# Patient Record
Sex: MALE | Race: WHITE | Employment: OTHER | URBAN - METROPOLITAN AREA
[De-identification: names, ages, dates, MRNs, and addresses within clinical notes are randomized per-mention and may not be internally consistent; named-entity substitution may affect disease eponyms.]

---

## 2022-01-13 ENCOUNTER — APPOINTMENT (OUTPATIENT)
Dept: CT IMAGING | Age: 82
End: 2022-01-13
Payer: MEDICARE

## 2022-01-13 ENCOUNTER — HOSPITAL ENCOUNTER (EMERGENCY)
Age: 82
Discharge: HOME | End: 2022-01-13
Payer: MEDICARE

## 2022-01-13 VITALS
HEART RATE: 73 BPM | TEMPERATURE: 97.9 F | DIASTOLIC BLOOD PRESSURE: 60 MMHG | WEIGHT: 209.5 LBS | OXYGEN SATURATION: 97 % | BODY MASS INDEX: 29.99 KG/M2 | RESPIRATION RATE: 16 BRPM | SYSTOLIC BLOOD PRESSURE: 172 MMHG | HEIGHT: 70 IN
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
## 2022-01-13 DIAGNOSIS — S40.022A CONTUSION OF LEFT UPPER ARM, INITIAL ENCOUNTER: ICD-10-CM

## 2022-01-13 DIAGNOSIS — S01.81XA FACIAL LACERATION, INITIAL ENCOUNTER: ICD-10-CM

## 2022-01-13 DIAGNOSIS — S00.03XA CONTUSION OF SCALP, INITIAL ENCOUNTER: Primary | ICD-10-CM

## 2022-01-13 PROCEDURE — 90715 TDAP VACCINE 7 YRS/> IM: CPT | Performed by: PHYSICIAN ASSISTANT

## 2022-01-13 PROCEDURE — 70450 CT HEAD/BRAIN W/O DYE: CPT

## 2022-01-13 PROCEDURE — 99284 EMERGENCY DEPT VISIT MOD MDM: CPT

## 2022-01-13 PROCEDURE — 6370000000 HC RX 637 (ALT 250 FOR IP): Performed by: PHYSICIAN ASSISTANT

## 2022-01-13 PROCEDURE — 12011 RPR F/E/E/N/L/M 2.5 CM/<: CPT

## 2022-01-13 PROCEDURE — 6360000002 HC RX W HCPCS: Performed by: PHYSICIAN ASSISTANT

## 2022-01-13 PROCEDURE — 90471 IMMUNIZATION ADMIN: CPT | Performed by: PHYSICIAN ASSISTANT

## 2022-01-13 RX ORDER — LIDOCAINE HYDROCHLORIDE AND EPINEPHRINE 10; 10 MG/ML; UG/ML
20 INJECTION, SOLUTION INFILTRATION; PERINEURAL ONCE
Status: DISCONTINUED | OUTPATIENT
Start: 2022-01-13 | End: 2022-01-13 | Stop reason: HOSPADM

## 2022-01-13 RX ORDER — ACETAMINOPHEN 500 MG
1000 TABLET ORAL ONCE
Status: COMPLETED | OUTPATIENT
Start: 2022-01-13 | End: 2022-01-13

## 2022-01-13 RX ADMIN — TETANUS TOXOID, REDUCED DIPHTHERIA TOXOID AND ACELLULAR PERTUSSIS VACCINE, ADSORBED 0.5 ML: 5; 2.5; 8; 8; 2.5 SUSPENSION INTRAMUSCULAR at 18:06

## 2022-01-13 RX ADMIN — ACETAMINOPHEN 1000 MG: 500 TABLET ORAL at 18:04

## 2022-01-13 ASSESSMENT — PAIN SCALES - GENERAL: PAINLEVEL_OUTOF10: 7

## 2022-01-13 ASSESSMENT — PAIN DESCRIPTION - LOCATION: LOCATION: HEAD

## 2022-01-13 ASSESSMENT — PAIN DESCRIPTION - PAIN TYPE: TYPE: ACUTE PAIN

## 2022-01-13 ASSESSMENT — PAIN DESCRIPTION - DESCRIPTORS: DESCRIPTORS: ACHING

## 2022-01-13 NOTE — ED PROVIDER NOTES
[id number] [address] ENCOUNTER        Pt Name: Bonnie Daniels  MRN: [id number]  [place] 1940  Date of evaluation: 1/13/2022  Provider: Nohemi Becerril PA-C  PCP: Nathan Escobar  Note Started: 4:56 PM EST      LAVELL. I have evaluated this patient. My supervising physician was available for consultation. Triage CHIEF COMPLAINT       Chief Complaint   Patient presents with    Laceration     head         HISTORY OF PRESENT ILLNESS   (Location/Symptom, Timing/Onset, Context/Setting, Quality, Duration, Modifying Factors, Severity)  Note limiting factors. Chief Complaint: Scalp injury after accidental fall    Bonnie Daniels is a 79 y.o. male who presents indicates that he had a outpatient test done at another hospital earlier this afternoon. He states on the way out of that area he missed a step and struck his forehead between the eyebrows on a pole. He has bleeding and seeping that has been going since then. That was about 2:45 PM.  He states that he went to that emergency department but they could not tell him how long his wait would be and he decided to come on over here to be evaluated at this emergency department. Patient states that he did not have any loss of consciousness. No dizziness. It was not a near syncopal episode that caused him to fall but rather a misstep. He states that he also struck the back of his left upper arm on the same pole. He states that it feels a little bruised and swollen back there at this time. It is moderate local tenderness worse if you push on it and better at rest.  He speculates that likely blood on the inside. No radiating pain. He did not take any medication for comfort so far but states that he would like some Tylenol. Patient denies any headache at all at this time or any vision change or neck pain or stiffness. No shortness of breath or chest pain or heart palpitations.   He denies belly pain or nausea or vomiting or extremity acute loss range of motion or strength or sensation. Nursing Notes were all reviewed and agreed with or any disagreements were addressed in the HPI. REVIEW OF SYSTEMS    (2-9 systems for level 4, 10 or more for level 5)     Review of Systems  Positive history as above with accidental fall and some swelling and tenderness moderate local to the posterior left upper arm as well as skin wound like a laceration between the eyebrows that continues to seep and bleed. Patient denying generalized headache, no vision change neck pain or stiffness dizziness confusion syncope or near syncope nausea or vomiting or abdominal pain. No neck pain or tenderness. No difficulty breathing or swallowing. No extremity acute loss of range of motion or strength or loss of sensation. PAST MEDICAL HISTORY     Past Medical History:   Diagnosis Date    Chronic kidney disease     Hypertension        SURGICAL HISTORY     Past Surgical History:   Procedure Laterality Date    SKIN BIOPSY      x 2       CURRENTMEDICATIONS       Previous Medications    ASCORBIC ACID (VITAMIN C) 500 MG TABLET    Take 500 mg by mouth 2 times daily. ASPIRIN 81 MG TABLET    Take 81 mg by mouth daily. CHOLECALCIFEROL (VITAMIN D) 2000 UNITS CAPS CAPSULE    Take  by mouth daily. HYDROCHLOROTHIAZIDE (HYDRODIURIL) 25 MG TABLET    Take 25 mg by mouth    LOSARTAN (COZAAR) 100 MG TABLET    Take 100 mg by mouth daily. METOPROLOL (TOPROL-XL) 100 MG XL TABLET    Take 100 mg by mouth daily. MYCOPHENOLATE (CELLCEPT) 500 MG TABLET    Take 1,000 mg by mouth 2 times daily. NIFEDIPINE    1 TABLET DAILY    OMEPRAZOLE (PRILOSEC) 40 MG CAPSULE    Take 40 mg by mouth daily. TACROLIMUS (PROGRAF) 1 MG CAPSULE    Take 1 mg by mouth 2 times daily. ALLERGIES     No known allergies    FAMILYHISTORY     History reviewed. No pertinent family history.      SOCIAL HISTORY       Social History     Socioeconomic History    Marital status:      Spouse name: None    Number of children: None    Years of education: None    Highest education level: None   Occupational History    None   Tobacco Use    Smoking status: Former Smoker    Smokeless tobacco: Never Used   Substance and Sexual Activity    Alcohol use: Not Currently    Drug use: Never    Sexual activity: None   Other Topics Concern    None   Social History Narrative    None     Social Determinants of Health     Financial Resource Strain:     Difficulty of Paying Living Expenses: Not on file   Food Insecurity:     Worried About Running Out of Food in the Last Year: Not on file    Luiza of Food in the Last Year: Not on file   Transportation Needs:     Lack of Transportation (Medical): Not on file    Lack of Transportation (Non-Medical):  Not on file   Physical Activity:     Days of Exercise per Week: Not on file    Minutes of Exercise per Session: Not on file   Stress:     Feeling of Stress : Not on file   Social Connections:     Frequency of Communication with Friends and Family: Not on file    Frequency of Social Gatherings with Friends and Family: Not on file    Attends Latter day Services: Not on file    Active Member of [address] [place] [address] or Organizations: Not on file    Attends Club or Organization Meetings: Not on file    Marital Status: Not on file   Intimate Partner Violence:     Fear of Current or Ex-Partner: Not on file    Emotionally Abused: Not on file    Physically Abused: Not on file    Sexually Abused: Not on file   Housing Stability:     Unable to Pay for Housing in the Last Year: Not on file    Number of [place] in the Last Year: Not on file    Unstable Housing in the Last Year: Not on file       SCREENINGS    Julio Coma Scale  Eye Opening: Spontaneous  Best Verbal Response: Oriented  Best Motor Response: Obeys commands  [place] Coma Scale Score: 15        PHYSICAL EXAM    (up to 7 for level 4, 8 or more for level 5)     ED Triage Vitals [01/13/22 1634]   BP Temp Temp Source Pulse Resp SpO2 Height Weight   (!) 172/60 97.9 °F (36.6 °C) Oral 73 16 97 % 5' 10\" (1.778 m) 209 lb 8 oz (95 kg)       Physical Exam  Vitals and nursing note reviewed. Constitutional:       Appearance: Normal appearance. He is not diaphoretic. HENT:      Head: Normocephalic. Contusion and laceration present. Jaw: Trismus present. Right Ear: External ear normal.      Left Ear: External ear normal.      Nose: Nose normal.      Mouth/Throat:      Mouth: Mucous membranes are moist.      Pharynx: No posterior oropharyngeal erythema. Eyes:      General:         Right eye: No discharge. Left eye: No discharge. Conjunctiva/sclera: Conjunctivae normal.   Cardiovascular:      Rate and Rhythm: Normal rate and regular rhythm. Pulses: Normal pulses. Heart sounds: Normal heart sounds. No murmur heard. No gallop. Pulmonary:      Effort: Pulmonary effort is normal. No respiratory distress. Breath sounds: Normal breath sounds. No wheezing, rhonchi or rales. Musculoskeletal:         General: Swelling and tenderness present. Cervical back: Normal range of motion and neck supple. No rigidity or tenderness. Comments: Posterior left upper arm with some localized tenderness and induration with likely hematoma. However no bony tenderness or deformity and good range of motion and strength persist according to patient. No elbow pain or wrist pain. No lower extremity acute changes. Distal pulses 2+ bilaterally in radialis. Capillary fill brisk less than 1 second throughout. Lymphadenopathy:      Cervical: No cervical adenopathy. Skin:     General: Skin is warm and dry. Capillary Refill: Capillary refill takes less than 2 seconds. Findings: No rash. Neurological:      Mental Status: He is alert and oriented to person, place, and time. Mental status is at baseline.    Psychiatric:         Mood and Affect: Mood normal. Behavior: Behavior normal.         DIAGNOSTIC RESULTS   LABS:    Labs Reviewed - No data to display    When ordered, only abnormal lab results are displayed. All other labs were within normal range or not returned as of this dictation. EKG: When ordered, EKG's are interpreted by the Emergency Department Physician in the absence of a cardiologist.  Please see their note for interpretation of EKG. RADIOLOGY:   Non-plain film images such as CT, Ultrasound and MRI are read by the radiologist. Plain radiographic images are visualized andpreliminarily interpreted by the  ED Provider with the below findings:        Interpretation perthe Radiologist below, if available at the time of this note:    CT HEAD WO CONTRAST   Final Result      No acute intracranial abnormality. No results found. PROCEDURES   Unless otherwise noted below, none     Lac Repair    Date/Time: 1/13/2022 6:05 PM  Performed by: Severiano Magallon PA-C  Authorized by: Severiano Magallon PA-C     Consent:     Consent obtained:  Verbal    Consent given by:  Patient  Anesthesia (see MAR for exact dosages):      Anesthesia method:  Local infiltration    Local anesthetic:  Lidocaine 1% WITH epi  Laceration details:     Location:  Face    Face location:  Forehead    Length (cm):  2    Depth (mm):  3  Pre-procedure details:     Preparation:  Patient was prepped and draped in usual sterile fashion and imaging obtained to evaluate for foreign bodies  Exploration:     Wound exploration: wound explored through full range of motion and entire depth of wound probed and visualized      Wound extent: areolar tissue violated      Wound extent: no foreign bodies/material noted, no muscle damage noted and no underlying fracture noted      Contaminated: no    Treatment:     Area cleansed with:  Hibiclens    Amount of cleaning:  Standard  Skin repair:     Repair method:  Sutures    Suture size:  5-0    Suture material:  Fast-absorbing gut    Suture technique: Simple interrupted and horizontal mattress    Number of sutures:  3  Approximation:     Approximation:  Loose  Post-procedure details:     Dressing:  Non-adherent dressing    Patient tolerance of procedure: Tolerated well, no immediate complications        CRITICAL CARE TIME   N/A    CONSULTS:  None      EMERGENCY DEPARTMENT COURSE and DIFFERENTIAL DIAGNOSIS/MDM:   Vitals:    Vitals:    01/13/22 1634   BP: (!) 172/60   Pulse: 73   Resp: 16   Temp: 97.9 °F (36.6 °C)   TempSrc: Oral   SpO2: 97%   Weight: 209 lb 8 oz (95 kg)   Height: 5' 10\" (1.778 m)       Patient was given thefollowing medications:  Medications   Tetanus-Diphth-Acell Pertussis (BOOSTRIX) injection 0.5 mL (has no administration in time range)   lidocaine-EPINEPHrine 1 %-1:719207 injection 20 mL (has no administration in time range)   acetaminophen (TYLENOL) tablet 1,000 mg (1,000 mg Oral Given 1/13/22 1804)         This patient presents as above and evaluation and treatment is begun here including CT plane of head obtained after discussing the medical necessity of this with the patient. He verbalizes agreement. Ice pack ordered as well as an Ace wrap and some Tylenol for patient's comfort here. No neck pain or tenderness at all. No loss of consciousness or syncope or neurologic deficits present at this time. Patient also has the hematoma left posterior upper arm but no bony tenderness. X-ray for this declined by patient at this time. Good range of motion and strength persist and no suspicion of acute bony injury felt to be highly likely at this time concerning the left upper extremity. Excellent wound care performed as above approximating patient's laceration/avulsion edges on the forehead between the eyebrows. No active bleeding at this time. CT head returns as above. No neurologic deficits on exam or confusion loss of consciousness or other worrisome issue at this time.   Patient is educated concerning appropriate home care and is appropriate for that at this time. He verbalizes understanding and agreement with the above and the following discharge home plan. Home in stable condition to do cold compresses to the left upper extremity 15 to 20 minutes every couple hours as needed for the next couple of days, use the Ace wrap during awake hours for the left upper extremity to help decrease swelling. Monitor for gradual improvement. May switch to warm compress to the left upper extremity after 2 days to help with reabsorption of hematoma. Follow with your family doctor in 2 to 3 days for recheck. Keep the wound clean dry and covered with new bandage and Neosporin and bandage for at least the first 2 to 3 days. Your sutures are dissolvable and are expected to disappear over the next several days. If in 2 weeks sutures are still in place, may see family doctor to have them removed. Return to the emergency department for any emergency worsening or concern. FINAL IMPRESSION      1. Contusion of scalp, initial encounter    2. Facial laceration, initial encounter    3. Contusion of left upper arm, initial encounter    4.  Accidental fall, initial encounter          DISPOSITION/PLAN   DISPOSITION        PATIENT REFERREDTO:  Wellington Garcia            DISCHARGE MEDICATIONS:  New Prescriptions    No medications on file       DISCONTINUED MEDICATIONS:  Discontinued Medications    No medications on file              (Please note that portions ofthis note were completed with a voice recognition program.  Efforts were made to edit the dictations but occasionally words are mis-transcribed.)    Rodolfo Mckeon PA-C (electronically signed)              Rodolfo Mckeon PA-C  01/13/22 7828